# Patient Record
Sex: FEMALE | Race: AMERICAN INDIAN OR ALASKA NATIVE
[De-identification: names, ages, dates, MRNs, and addresses within clinical notes are randomized per-mention and may not be internally consistent; named-entity substitution may affect disease eponyms.]

---

## 2020-10-08 ENCOUNTER — HOSPITAL ENCOUNTER (EMERGENCY)
Dept: HOSPITAL 5 - ED | Age: 30
Discharge: HOME | End: 2020-10-08
Payer: MEDICAID

## 2020-10-08 VITALS — DIASTOLIC BLOOD PRESSURE: 72 MMHG | SYSTOLIC BLOOD PRESSURE: 124 MMHG

## 2020-10-08 DIAGNOSIS — O99.611: ICD-10-CM

## 2020-10-08 DIAGNOSIS — Z79.899: ICD-10-CM

## 2020-10-08 DIAGNOSIS — Z3A.10: ICD-10-CM

## 2020-10-08 DIAGNOSIS — K82.8: ICD-10-CM

## 2020-10-08 DIAGNOSIS — K21.9: ICD-10-CM

## 2020-10-08 DIAGNOSIS — O21.0: ICD-10-CM

## 2020-10-08 DIAGNOSIS — O23.41: Primary | ICD-10-CM

## 2020-10-08 LAB
ALBUMIN SERPL-MCNC: 4.6 G/DL (ref 3.9–5)
ALT SERPL-CCNC: 19 UNITS/L (ref 7–56)
BACTERIA #/AREA URNS HPF: (no result) /HPF
BASOPHILS # (AUTO): 0 K/MM3 (ref 0–0.1)
BASOPHILS NFR BLD AUTO: 0.2 % (ref 0–1.8)
BILIRUB UR QL STRIP: (no result)
BLOOD UR QL VISUAL: (no result)
BUN SERPL-MCNC: 14 MG/DL (ref 7–17)
BUN/CREAT SERPL: 23 %
CALCIUM SERPL-MCNC: 10.2 MG/DL (ref 8.4–10.2)
EOSINOPHIL # BLD AUTO: 0 K/MM3 (ref 0–0.4)
EOSINOPHIL NFR BLD AUTO: 0 % (ref 0–4.3)
HCT VFR BLD CALC: 37.9 % (ref 30.3–42.9)
HEMOLYSIS INDEX: 0
HGB BLD-MCNC: 13.7 GM/DL (ref 10.1–14.3)
LYMPHOCYTES # BLD AUTO: 1.8 K/MM3 (ref 1.2–5.4)
LYMPHOCYTES NFR BLD AUTO: 13.4 % (ref 13.4–35)
MCHC RBC AUTO-ENTMCNC: 36 % (ref 30–34)
MCV RBC AUTO: 92 FL (ref 79–97)
MONOCYTES # (AUTO): 0.7 K/MM3 (ref 0–0.8)
MONOCYTES % (AUTO): 5.5 % (ref 0–7.3)
MUCOUS THREADS #/AREA URNS HPF: (no result) /HPF
PH UR STRIP: 6 [PH] (ref 5–7)
PLATELET # BLD: 381 K/MM3 (ref 140–440)
RBC # BLD AUTO: 4.14 M/MM3 (ref 3.65–5.03)
RBC #/AREA URNS HPF: 10 /HPF (ref 0–6)
UROBILINOGEN UR-MCNC: 2 MG/DL (ref ?–2)
WBC #/AREA URNS HPF: 9 /HPF (ref 0–6)

## 2020-10-08 PROCEDURE — 76817 TRANSVAGINAL US OBSTETRIC: CPT

## 2020-10-08 PROCEDURE — 80053 COMPREHEN METABOLIC PANEL: CPT

## 2020-10-08 PROCEDURE — 85025 COMPLETE CBC W/AUTO DIFF WBC: CPT

## 2020-10-08 PROCEDURE — 36415 COLL VENOUS BLD VENIPUNCTURE: CPT

## 2020-10-08 PROCEDURE — 87076 CULTURE ANAEROBE IDENT EACH: CPT

## 2020-10-08 PROCEDURE — 96375 TX/PRO/DX INJ NEW DRUG ADDON: CPT

## 2020-10-08 PROCEDURE — 87186 SC STD MICRODIL/AGAR DIL: CPT

## 2020-10-08 PROCEDURE — 83690 ASSAY OF LIPASE: CPT

## 2020-10-08 PROCEDURE — 99284 EMERGENCY DEPT VISIT MOD MDM: CPT

## 2020-10-08 PROCEDURE — 84702 CHORIONIC GONADOTROPIN TEST: CPT

## 2020-10-08 PROCEDURE — 96374 THER/PROPH/DIAG INJ IV PUSH: CPT

## 2020-10-08 PROCEDURE — 76700 US EXAM ABDOM COMPLETE: CPT

## 2020-10-08 PROCEDURE — 96361 HYDRATE IV INFUSION ADD-ON: CPT

## 2020-10-08 PROCEDURE — 81001 URINALYSIS AUTO W/SCOPE: CPT

## 2020-10-08 PROCEDURE — 87086 URINE CULTURE/COLONY COUNT: CPT

## 2020-10-08 NOTE — ULTRASOUND REPORT
ULTRASOUND OBSTETRIC 



Indication:

abdominal pain w/ pregnancy



Findings:

There is a single, living intrauterine pregnancy. 



Crown-rump length = 3.2 cm = 10 weeks, 1 day(s).



Fetal heart rate is 168 beats per minute. 



The ovaries are normal. There is no free fluid.



Impression:

Single, living intrauterine pregnancy with estimated sonographic age of 10 weeks, 1 day(s).



Signer Name: Soto Roberts MD 

Signed: 10/8/2020 2:28 PM

Workstation Name: [id number]

## 2020-10-08 NOTE — ULTRASOUND REPORT
ULTRASOUND ABDOMEN, COMPLETE



INDICATION:

abdominal pain w/ pregnancy.



COMPARISON:

No relevant prior imaging study available. 



FINDINGS:

Pancreas: No significant abnormality.

Abdominal Aorta: No significant abnormality. IVC: No significant abnormality.

Liver:   No significant abnormality. Normal hepatopedal blood flow in the main portal vein.

Gallbladder: Gallbladder sludge/small gallstones.. 

Bile ducts: No significant abnormality. Common bile duct measures 3 mm. 

Kidneys: Right:    No significant abnormality. Left:  No significant abnormality.

Spleen: No significant abnormality.



Free fluid: None.

Additional Findings: None.



IMPRESSION:

Mild gallbladder sludge/tiny gallstones noted. No gallbladder wall thickening appreciated.



Signer Name: Soto Roberts MD 

Signed: 10/8/2020 2:22 PM

Workstation Name: [id number]

## 2020-10-08 NOTE — EMERGENCY DEPARTMENT REPORT
ED Abdominal Pain HPI





- General


Chief Complaint: Abdominal Pain


Stated Complaint: CHEST PAIN, ABD PAIN


Time Seen by Provider: 10/08/20 11:14


Source: patient


Mode of arrival: Ambulatory


Limitations: No Limitations





- History of Present Illness


Initial Comments: 





Patient is a 29-year-old female presents emergency room with complaints of upper

abdominal pain that began 3 days ago.  She states that it feels like a burning 

sensation.  She states that she feels like she has increased gas and has had 

increased belching.  She states that she is also been having nausea vomiting 

throughout her pregnancy but worse in the last 3 days.  She states that she is 

not able to tolerate p.o. intake.  She is currently 10 weeks pregnant.  She 

states that her OB is at [facility].  She states that she has been 

taking Phenergan without much relief.  She states her last menstrual cycle was 

July 30.  She denies any fever, diarrhea, dysuria, vaginal bleeding.  She does 

not report any vaginal discharge or irritation.  She has a past medical history 

of hyperemesis and preeclampsia.  No allergies to medications.  G:/P: 2/A: 1





- Related Data


                                  Previous Rx's











 Medication  Instructions  Recorded  Last Taken  Type


 


Mag Hydrox/Aluminum Hyd/Simeth 15 ml PO Q8HR PRN 7 Days #1 10/08/20 Unknown Rx





[Maalox Advanced Suspension] oral.susp   


 


Metoclopramide [Reglan] 10 mg PO Q8HR PRN #12 tab 10/08/20 Unknown Rx


 


cephALEXin [Keflex] 500 mg PO BID 7 Days #14 cap 10/08/20 Unknown Rx











                                    Allergies











Allergy/AdvReac Type Severity Reaction Status Date / Time


 


No Known Allergies Allergy   Unverified 10/08/20 11:13














ED Review of Systems


ROS: 


Stated complaint: CHEST PAIN, ABD PAIN


Other details as noted in HPI





Comment: All other systems reviewed and negative





ED Past Medical Hx





- Past Medical History


Previous Medical History?: No





- Surgical History


Past Surgical History?: No





- Medications


Home Medications: 


                                Home Medications











 Medication  Instructions  Recorded  Confirmed  Last Taken  Type


 


Mag Hydrox/Aluminum Hyd/Simeth 15 ml PO Q8HR PRN 7 Days #1 10/08/20  Unknown Rx





[Maalox Advanced Suspension] oral.susp    


 


Metoclopramide [Reglan] 10 mg PO Q8HR PRN #12 tab 10/08/20  Unknown Rx


 


cephALEXin [Keflex] 500 mg PO BID 7 Days #14 cap 10/08/20  Unknown Rx














ED Physical Exam





- General


Limitations: No Limitations


General appearance: alert, in no apparent distress





- Head


Head exam: Present: atraumatic, normocephalic





- Eye


Eye exam: Present: normal appearance





- ENT


ENT exam: Present: mucous membranes moist





- Respiratory


Respiratory exam: Present: normal lung sounds bilaterally.  Absent: respiratory 

distress, wheezes, rales, rhonchi, stridor, chest wall tenderness, accessory 

muscle use, decreased breath sounds, prolonged expiratory





- Cardiovascular


Cardiovascular Exam: Present: regular rate, normal rhythm, normal heart sounds. 

 Absent: systolic murmur, diastolic murmur, rubs, gallop





- GI/Abdominal


GI/Abdominal exam: Present: soft, normal bowel sounds.  Absent: distended, 

tenderness, guarding, rebound, rigid





- Neurological Exam


Neurological exam: Present: alert, oriented X3





- Psychiatric


Psychiatric exam: Present: normal affect, normal mood





- Skin


Skin exam: Present: warm, dry, intact





ED Course


                                   Vital Signs











  10/08/20





  11:12


 


Temperature 98.3 F


 


Pulse Rate 78


 


Respiratory 18





Rate 


 


Blood Pressure 141/76


 


O2 Sat by Pulse 99





Oximetry 














ED Medical Decision Making





- Lab Data


Result diagrams: 


                                 10/08/20 11:25





                                 10/08/20 11:25








                                   Lab Results











  10/08/20 10/08/20 10/08/20 Range/Units





  11:25 11:25 11:25 


 


WBC  13.4 H    (4.5-11.0)  K/mm3


 


RBC  4.14    (3.65-5.03)  M/mm3


 


Hgb  13.7    (10.1-14.3)  gm/dl


 


Hct  37.9    (30.3-42.9)  %


 


MCV  92    (79-97)  fl


 


MCH  33 H    (28-32)  pg


 


MCHC  36 H    (30-34)  %


 


RDW  12.6 L    (13.2-15.2)  %


 


Plt Count  381    (140-440)  K/mm3


 


Lymph % (Auto)  13.4    (13.4-35.0)  %


 


Mono % (Auto)  5.5    (0.0-7.3)  %


 


Eos % (Auto)  0.0    (0.0-4.3)  %


 


Baso % (Auto)  0.2    (0.0-1.8)  %


 


Lymph # (Auto)  1.8    (1.2-5.4)  K/mm3


 


Mono # (Auto)  0.7    (0.0-0.8)  K/mm3


 


Eos # (Auto)  0.0    (0.0-0.4)  K/mm3


 


Baso # (Auto)  0.0    (0.0-0.1)  K/mm3


 


Seg Neutrophils %  80.9 H    (40.0-70.0)  %


 


Seg Neutrophils #  10.8 H    (1.8-7.7)  K/mm3


 


Sodium   136 L   (137-145)  mmol/L


 


Potassium   4.4   (3.6-5.0)  mmol/L


 


Chloride   97.3 L   ()  mmol/L


 


Carbon Dioxide   22   (22-30)  mmol/L


 


Anion Gap   21   mmol/L


 


BUN   14   (7-17)  mg/dL


 


Creatinine   0.6   (0.6-1.2)  mg/dL


 


Estimated GFR   > 60   ml/min


 


BUN/Creatinine Ratio   23   %


 


Glucose   93   ()  mg/dL


 


Calcium   10.2   (8.4-10.2)  mg/dL


 


Total Bilirubin   0.30   (0.1-1.2)  mg/dL


 


AST   15   (5-40)  units/L


 


ALT   19   (7-56)  units/L


 


Alkaline Phosphatase   50   ()  units/L


 


Total Protein   7.7   (6.3-8.2)  g/dL


 


Albumin   4.6   (3.9-5)  g/dL


 


Albumin/Globulin Ratio   1.5   %


 


Lipase   20   (13-60)  units/L


 


HCG, Quant    [id number] H  (0-4)  mIU/mL


 


Urine Color     (Yellow)  


 


Urine Turbidity     (Clear)  


 


Urine pH     (5.0-7.0)  


 


Ur Specific Gravity     (1.003-1.030)  


 


Urine Protein     (Negative)  mg/dL


 


Urine Glucose (UA)     (Negative)  mg/dL


 


Urine Ketones     (Negative)  mg/dL


 


Urine Blood     (Negative)  


 


Urine Nitrite     (Negative)  


 


Urine Bilirubin     (Negative)  


 


Urine Urobilinogen     (<2.0)  mg/dL


 


Ur Leukocyte Esterase     (Negative)  


 


Urine WBC (Auto)     (0.0-6.0)  /HPF


 


Urine RBC (Auto)     (0.0-6.0)  /HPF


 


U Epithel Cells (Auto)     (0-13.0)  /HPF


 


Urine Bacteria (Auto)     (Negative)  /HPF


 


Urine Mucus     /HPF














  10/08/20 Range/Units





  11:32 


 


WBC   (4.5-11.0)  K/mm3


 


RBC   (3.65-5.03)  M/mm3


 


Hgb   (10.1-14.3)  gm/dl


 


Hct   (30.3-42.9)  %


 


MCV   (79-97)  fl


 


MCH   (28-32)  pg


 


MCHC   (30-34)  %


 


RDW   (13.2-15.2)  %


 


Plt Count   (140-440)  K/mm3


 


Lymph % (Auto)   (13.4-35.0)  %


 


Mono % (Auto)   (0.0-7.3)  %


 


Eos % (Auto)   (0.0-4.3)  %


 


Baso % (Auto)   (0.0-1.8)  %


 


Lymph # (Auto)   (1.2-5.4)  K/mm3


 


Mono # (Auto)   (0.0-0.8)  K/mm3


 


Eos # (Auto)   (0.0-0.4)  K/mm3


 


Baso # (Auto)   (0.0-0.1)  K/mm3


 


Seg Neutrophils %   (40.0-70.0)  %


 


Seg Neutrophils #   (1.8-7.7)  K/mm3


 


Sodium   (137-145)  mmol/L


 


Potassium   (3.6-5.0)  mmol/L


 


Chloride   ()  mmol/L


 


Carbon Dioxide   (22-30)  mmol/L


 


Anion Gap   mmol/L


 


BUN   (7-17)  mg/dL


 


Creatinine   (0.6-1.2)  mg/dL


 


Estimated GFR   ml/min


 


BUN/Creatinine Ratio   %


 


Glucose   ()  mg/dL


 


Calcium   (8.4-10.2)  mg/dL


 


Total Bilirubin   (0.1-1.2)  mg/dL


 


AST   (5-40)  units/L


 


ALT   (7-56)  units/L


 


Alkaline Phosphatase   ()  units/L


 


Total Protein   (6.3-8.2)  g/dL


 


Albumin   (3.9-5)  g/dL


 


Albumin/Globulin Ratio   %


 


Lipase   (13-60)  units/L


 


HCG, Quant   (0-4)  mIU/mL


 


Urine Color  Ashley  (Yellow)  


 


Urine Turbidity  Slightly-cloudy  (Clear)  


 


Urine pH  6.0  (5.0-7.0)  


 


Ur Specific Gravity  1.038 H  (1.003-1.030)  


 


Urine Protein  100 mg/dl  (Negative)  mg/dL


 


Urine Glucose (UA)  Neg  (Negative)  mg/dL


 


Urine Ketones  80  (Negative)  mg/dL


 


Urine Blood  Neg  (Negative)  


 


Urine Nitrite  Neg  (Negative)  


 


Urine Bilirubin  Neg  (Negative)  


 


Urine Urobilinogen  2.0  (<2.0)  mg/dL


 


Ur Leukocyte Esterase  Sm  (Negative)  


 


Urine WBC (Auto)  9.0 H  (0.0-6.0)  /HPF


 


Urine RBC (Auto)  10.0  (0.0-6.0)  /HPF


 


U Epithel Cells (Auto)  19.0 H  (0-13.0)  /HPF


 


Urine Bacteria (Auto)  3+  (Negative)  /HPF


 


Urine Mucus  3+  /HPF














- Radiology Data


Radiology results: report reviewed





ULTRASOUND OBSTETRIC 





Indication: 


abdominal pain w/ pregnancy 





Findings: 


There is a single, living intrauterine pregnancy. 





Crown-rump length = 3.2 cm = 10 weeks, 1 day(s). 





Fetal heart rate is 168 beats per minute. 





The ovaries are normal. There is no free fluid. 





Impression: 


Single, living intrauterine pregnancy with estimated sonographic age of 10 

weeks, 1 day(s). 





Signer Name: Soto Roberts MD 


Signed: 10/8/2020 2:28 PM 


Workstation Name: [id number] 








 Transcribed By: BC 


 Dictated By: Soto Roberts MD 


 Electronically Authenticated By: Soto Roberts MD 


 Signed Date/Time: 10/08/20 1428 











 DD/DT: 10/08/20 1422 


 TD/TT: 








 ULTRASOUND ABDOMEN, COMPLETE 





INDICATION: 


abdominal pain w/ pregnancy. 





COMPARISON: 


No relevant prior imaging study available. 





FINDINGS: 


Pancreas: No significant abnormality. 


Abdominal Aorta: No significant abnormality. IVC: No significant abnormality. 


Liver: No significant abnormality. Normal hepatopedal blood flow in the main 

portal vein. 


Gallbladder: Gallbladder sludge/small gallstones.. 


Bile ducts: No significant abnormality. Common bile duct measures 3 mm. 


Kidneys: Right: No significant abnormality. Left: No significant abnormality. 


Spleen: No significant abnormality. 





Free fluid: None. 


Additional Findings: None. 





IMPRESSION: 


Mild gallbladder sludge/tiny gallstones noted. No gallbladder wall thickening 

appreciated. 





Signer Name: Soto Roberts MD 


Signed: 10/8/2020 2:22 PM 


Workstation Name: [id number] 








 Transcribed By: BC 


 Dictated By: Soto Roberts MD 


 Electronically Authenticated By: Soto Roberts MD 


 Signed Date/Time: 10/08/20 1422 











 DD/DT: 10/08/20 1421 


 TD/TT: 





- Medical Decision Making





Patient is a 29-year-old female presents emergency room with complaints of upper

 abdominal pain that began 3 days ago.  She states that it feels like a burning 

sensation.  She states that she feels like she has increased gas and has had 

increased belching.  She states that she is also been having nausea vomiting 

throughout her pregnancy but worse in the last 3 days.  She states that she is 

not able to tolerate p.o. intake.  She is currently 10 weeks pregnant.  She 

states that her OB is at [facility].  She states that she has been 

taking Phenergan without much relief.  She states her last menstrual cycle was 

July 30.  She denies any fever, diarrhea, dysuria, vaginal bleeding.  She does 

not report any vaginal discharge or irritation.  She has a past medical history 

of hyperemesis and preeclampsia.  No allergies to medications.  G:/P: 2/A: 1. 

VSS.  No abdominal tenderness on exam, no guarding, no rebound, no rigidity, 

normal bowel sounds, no peritoneal signs.  Labs with mildly elevated white blood

 cell count and signs of dehydration.  UA shows evidence of dehydration and UTI.

 OB US: Single, living intrauterine pregnancy with estimated sonographic age of 

10 weeks, 1 day(s). abdominal US: Mild gallbladder sludge/tiny gallstones noted.

 No gallbladder wall thickening appreciated.  Patient given 1 L IV fluids, 

Reglan, Benadryl, Maalox, Tylenol and symptoms improved and patient was feeling 

much better.  Patient was able to tolerate p.o. intake without any difficulty 

while in the emergency department.  Discussed all results with patient and 

answered questions.  Patient given prescription for Maalox, Reglan, Keflex.  

Advised patient Please take medication as prescribed.  Please do not take this 

medication with Phenergan as they can interact.  Increase your water intake over

 the next several days.  Eat a bland liquid diet and slowly advance your diet as

 tolerated.  Follow-up with your primary care doctor.  Follow-up with your 

OB/GYN.  Return to emergency room for any new or worsening symptoms.





- Differential Diagnosis


GERD, PUD, hyperemesis, dehydration, electrolyte, cholecystitis, cholelithi


Critical care attestation.: 


If time is entered above; I have spent that time in minutes in the direct care 

of this critically ill patient, excluding procedure time.








ED Disposition


Clinical Impression: 


 Hyperemesis, Gallbladder sludge





GERD (gastroesophageal reflux disease)


Qualifiers:


 Esophagitis presence: without esophagitis Qualified Code(s): K21.9 - Gastro-

esophageal reflux disease without esophagitis





Abdominal pain during pregnancy


Qualifiers:


 Trimester: first trimester Qualified Code(s): O26.891 - Other specified 

pregnancy related conditions, first trimester





UTI (urinary tract infection)


Qualifiers:


 Urinary tract infection type: acute cystitis Hematuria presence: without 

hematuria Qualified Code(s): N30.00 - Acute cystitis without hematuria





Disposition: DC-01 TO HOME OR SELFCARE


Is pt being admited?: No


Does the pt Need Aspirin: No


Condition: Stable


Instructions:  Hyperemesis Gravidarum (ED), Urinary Tract Infection in Women 

(ED), Diet for Ulcers and Gastritis (ED), Gastroesophageal Reflux Disease (ED), 

Abdominal Pain in Pregnancy  (ED)


Additional Instructions: 


Please take medication as prescribed.  Please do not take this medication with 

Phenergan as they can interact.  Increase your water intake over the next 

several days.  Eat a bland liquid diet and slowly advance your diet as 

tolerated.  Follow-up with your primary care doctor.  Follow-up with your 

OB/GYN.  Return to emergency room for any new or worsening symptoms.


Prescriptions: 


cephALEXin [Keflex] 500 mg PO BID 7 Days #14 cap


Mag Hydrox/Aluminum Hyd/Simeth [Maalox Advanced Suspension] 15 ml PO Q8HR PRN 7 

Days #1 oral.susp


 PRN Reason: acid reflux


Metoclopramide [Reglan] 10 mg PO Q8HR PRN #12 tab


 PRN Reason: Nausea And Vomiting


Referrals: 


your, OB/GYN [Other] - 2-3 Days


your, primary care doctor [Other] - 2-3 Days


Time of Disposition: 16:01


Print Language: ENGLISH

## 2020-10-08 NOTE — EMERGENCY DEPARTMENT REPORT
Blank Doc





- Documentation


Documentation: 





29-year-old female that presents with right upper abdominal pain with radiation 

to right flank.  Pt is 10 weeks pregnant.  Denies any vaginal bleeding.





This initial assessment/diagnostic orders/clinical plan/treatment(s) is/are 

subject to change based on patient's health status, clinical progression and re-

assessment by fellow clinical providers in the ED.  Further treatment and workup

at subsequent clinical providers discretion.  Patient/guardians urged not to 

elope from the ED as their condition may be serious if not clinically assessed 

and managed.  Initial orders include:


1- Patient sent to [facility] for further evaluation and treatment


2- labs


3- UA


4- US OB AND ABD

## 2020-10-09 ENCOUNTER — HOSPITAL ENCOUNTER (EMERGENCY)
Dept: HOSPITAL 5 - ED | Age: 30
Discharge: LEFT BEFORE DISCHARGE | End: 2020-10-09
Payer: MEDICAID

## 2020-10-09 DIAGNOSIS — Z53.21: ICD-10-CM

## 2020-10-09 DIAGNOSIS — R11.2: Primary | ICD-10-CM
